# Patient Record
Sex: MALE | Race: WHITE | NOT HISPANIC OR LATINO | Employment: FULL TIME | URBAN - METROPOLITAN AREA
[De-identification: names, ages, dates, MRNs, and addresses within clinical notes are randomized per-mention and may not be internally consistent; named-entity substitution may affect disease eponyms.]

---

## 2018-06-19 ENCOUNTER — HOSPITAL ENCOUNTER (EMERGENCY)
Facility: HOSPITAL | Age: 48
Discharge: HOME | End: 2018-06-19
Payer: MEDICARE

## 2018-06-19 VITALS
BODY MASS INDEX: 34.1 KG/M2 | RESPIRATION RATE: 18 BRPM | WEIGHT: 230.25 LBS | HEIGHT: 69 IN | OXYGEN SATURATION: 97 % | TEMPERATURE: 99 F | HEART RATE: 80 BPM

## 2018-06-19 DIAGNOSIS — L03.115 CELLULITIS OF RIGHT KNEE: Primary | ICD-10-CM

## 2018-06-19 PROCEDURE — 99283 EMERGENCY DEPT VISIT LOW MDM: CPT

## 2018-06-19 RX ORDER — MUPIROCIN 20 MG/G
OINTMENT TOPICAL 3 TIMES DAILY
Qty: 30 G | Refills: 0 | Status: SHIPPED | OUTPATIENT
Start: 2018-06-19

## 2018-06-19 RX ORDER — SULFAMETHOXAZOLE AND TRIMETHOPRIM 800; 160 MG/1; MG/1
2 TABLET ORAL 2 TIMES DAILY
Qty: 28 TABLET | Refills: 0 | Status: SHIPPED | OUTPATIENT
Start: 2018-06-19 | End: 2018-06-26

## 2018-06-19 NOTE — ED PROVIDER NOTES
SCRIBE #1 NOTE: I, Kaelyn Juvenal, am scribing for, and in the presence of, Donell Gregorio NP. I have scribed the entire note.      History      Chief Complaint   Patient presents with    Recurrent Skin Infections     here to get R knee checked for staph       Review of patient's allergies indicates:  No Known Allergies     HPI   HPI    6/19/2018, 3:31 PM   History obtained from the wife and patient  Offered interpretive services, but patient declined and wanted to use pen and paper to communicate      History of Present Illness: Luis Marcus is a 47 y.o. male patient who presents to the Emergency Department for wound check. Patient reports being referred to the ED by PCP, Dr. Leos for further evaluation of R knee wound. Wife reports patient is having recurrent skin infections and has been previously treated with abx. Patient denies recent abx use. Symptoms are constant and moderate in severity. No mitigating or exacerbating factors reported. Associated sxs include mild R knee pain and increasing erythema to R knee. Patient denies any fever, chills, increased swelling/warmth to R knee, decreased ROM of R knee, extremity weakness/numbness/tingling, and all other sxs at this time. Wife reports patient has f/u appointment with PCP in 2 days. No further complaints or concerns at this time.       Arrival mode: Personal vehicle      PCP: Primary Doctor No       Past Medical History:  History reviewed. No pertinent medical history.    Past Surgical History:  History reviewed. No pertinent surgical history.    Family History:  Family History   Problem Relation Age of Onset    Hypertension Mother     Diabetes Mother        Social History:  Social History     Social History Main Topics    Smoking status: Never Smoker    Smokeless tobacco: Unknown    Alcohol use Unknown    Drug use: Unknown    Sexual activity: Unknown       ROS   Review of Systems   Constitutional: Negative for chills and fever.        (+)  wound check to R knee   HENT: Negative for sore throat.    Respiratory: Negative for shortness of breath.    Cardiovascular: Negative for chest pain.   Gastrointestinal: Negative for nausea.   Genitourinary: Negative for dysuria.   Musculoskeletal: Negative for back pain.        (+) mild R knee pain  (-) increased swelling/warmth to R knee, decreased ROM of R knee   Skin: Negative for rash.        (+) increasing erythema to R knee   Neurological: Negative for weakness and numbness.        (-) extremity tingling   Hematological: Does not bruise/bleed easily.   All other systems reviewed and are negative.      Physical Exam      Initial Vitals [06/19/18 1523]   BP Pulse Resp Temp SpO2   -- 80 18 98.7 °F (37.1 °C) 97 %      MAP       --           Physical Exam  Nursing Notes and Vital Signs Reviewed.  Constitutional: Patient is in no acute distress. Well-developed and well-nourished.  Head: Atraumatic. Normocephalic.  Eyes: PERRL. EOM intact. Conjunctivae are not pale. No scleral icterus.  ENT: Mucous membranes are moist. Oropharynx is clear and symmetric.    Neck: Supple. Full ROM. No lymphadenopathy.  Cardiovascular: Regular rate. Regular rhythm. No murmurs, rubs, or gallops. Distal pulses are 2+ and symmetric.  Pulmonary/Chest: No respiratory distress. Clear to auscultation bilaterally. No wheezing or rales.  Abdominal: Soft and non-distended.  There is no tenderness.  No rebound, guarding, or rigidity. Good bowel sounds.  Genitourinary: No CVA tenderness  Musculoskeletal: Moves all extremities. No obvious deformities. No edema. No calf tenderness.  Right Knee:  No obvious deformity.  5cm area of erythema and induration to anterior R knee, no fluctuance appreciated. No ligament laxity. DP and PT pulses are 2+.  Normal capillary refill.  Distal sensation is intact.  Skin: Warm and dry.  Neurological:  Alert, awake, and appropriate.  Normal speech.  No acute focal neurological deficits are appreciated.  Psychiatric:  "Normal affect. Good eye contact. Appropriate in content.    ED Course    Procedures  ED Vital Signs:  Vitals:    06/19/18 1523   Pulse: 80   Resp: 18   Temp: 98.7 °F (37.1 °C)   TempSrc: Oral   SpO2: 97%   Weight: 104.5 kg (230 lb 4.3 oz)   Height: 5' 9" (1.753 m)              The Emergency Provider reviewed the vital signs and test results, which are outlined above.    ED Discussion     3:41 PM: Reassessed pt at this time. Discussed with pt all pertinent ED information and results. Discussed pt dx and plan of tx. Gave pt all f/u and return to the ED instructions. All questions and concerns were addressed at this time. Pt expresses understanding of information and instructions, and is comfortable with plan to discharge. Pt is stable for discharge.  Advised patient to f/u with PCP in 2 days and to return to ED for any worsening fever, pain, and/or increased warmth/redness/swelling to R knee.    I discussed with patient and/or family/caretaker that evaluation in the ED does not suggest any emergent or life threatening medical conditions requiring immediate intervention beyond what was provided in the ED, and I believe patient is safe for discharge.  Regardless, an unremarkable evaluation in the ED does not preclude the development or presence of a serious of life threatening condition. As such, patient was instructed to return immediately for any worsening or change in current symptoms.      ED Medication(s):  Medications - No data to display    New Prescriptions    MUPIROCIN (BACTROBAN) 2 % OINTMENT    Apply topically 3 (three) times daily.    SULFAMETHOXAZOLE-TRIMETHOPRIM 800-160MG (BACTRIM DS) 800-160 MG TAB    Take 2 tablets by mouth 2 (two) times daily.       Follow-up Information     Sav Leos MD In 2 days.    Specialty:  Family Medicine  Contact information:  [address] [facility] [address] [facility]   [address]  [facility] [address] [phone]             [facility] .    Specialty:  Emergency Medicine  Why:  If " symptoms worsen  Contact information:  [address]  [facility] [address] [phone]                   Medical Decision Making              Scribe Attestation:   Scribe #1: I performed the above scribed service and the documentation accurately describes the services I performed. I attest to the accuracy of the note.    Attending:   Physician Attestation Statement for Scribe #1: I, Donell Gregorio NP, personally performed the services described in this documentation, as scribed by Kaelyn Sanford, in my presence, and it is both accurate and complete.          Clinical Impression       ICD-10-CM ICD-9-CM   1. Cellulitis of right knee L03.115 682.6       Disposition:   Disposition: Discharged  Condition: Stable         Donell Gregorio Jr., [facility]  06/20/18 1514